# Patient Record
Sex: FEMALE | Race: WHITE
[De-identification: names, ages, dates, MRNs, and addresses within clinical notes are randomized per-mention and may not be internally consistent; named-entity substitution may affect disease eponyms.]

---

## 2019-07-19 NOTE — WOMENS IMAGING REPORT
EXAM DESCRIPTION:  PINK WARRIOR BILATERAL SCREEN



COMPLETED DATE/TIME:  7/19/2019 2:19 pm



REASON FOR STUDY:  ROUTINE SCREENING MAMMOGRAM Z12.31 Z12.31  ENCNTR SCREEN MAMMOGRAM FOR MALIGNANT N
EOPLASM OF BEKAH



COMPARISON:  None.



EXAM PARAMETERS:  Standard craniocaudal and mediolateral oblique views of each breast recorded using 
digital acquisition.  Additional "push-back" craniocaudal and mediolateral oblique images acquired.

Read with the assistance of CAD.

.[facility] - R2  Version 9.2



LIMITATIONS:  None.



FINDINGS:  IMPLANTS: Bilateral subglandular implants.

Findings present which are benign by mammographic criteria.  No suspicious masses, calcifications or 
architectural distortion.

Benign mammographic findings may include one or more of the following:  Smooth masses, popcorn/rim/co
arse calcifications, asymmetries, post-procedure changes, and lesions with long-standing stability.



IMPRESSION:  BENIGN MAMMOGRAPHIC FINDINGS.  BIRADS 2



BREAST DENSITY:  b. There are scattered areas of fibroglandular density.



BIRAD:  ASSESSMENT:  2 BENIGN FINDING(S)



RECOMMENDATION:  ROUTINE SCREENING



COMMENT:  The patient has been notified of the results by letter per MQSA requirements. Additional no
tification policies are in place for contacting patient with suspicious or incomplete findings.

Quality ID #225: The [employer] recommends an annual screening mammogram for women
 aged 40 years or over. This facility utilizes a reminder system to ensure that all patients receive 
reminder letters, and/or direct phone calls for appointments. This includes reminders for routine scr
eening mammograms, diagnostic mammograms, or other Breast Imaging Interventions when appropriate.  Th
is patient will be placed in the appropriate reminder system.



TECHNICAL DOCUMENTATION:  FINDING NUMBER: (1)

ASSESSMENT: (1)

JOB ID:  [id number]

 2011 [employer]- All Rights Reserved



Reading location - IP/workstation name: [facility]-RR

## 2019-12-20 ENCOUNTER — HOSPITAL ENCOUNTER (OUTPATIENT)
Dept: HOSPITAL 62 - CCC | Age: 59
End: 2019-12-20
Payer: COMMERCIAL

## 2019-12-20 DIAGNOSIS — R53.82: ICD-10-CM

## 2019-12-20 DIAGNOSIS — K59.00: Primary | ICD-10-CM

## 2019-12-20 DIAGNOSIS — R94.6: ICD-10-CM

## 2019-12-20 LAB
ADD MANUAL DIFF: NO
BASOPHILS # BLD AUTO: 0 10^3/UL (ref 0–0.2)
BASOPHILS NFR BLD AUTO: 1 % (ref 0–2)
EOSINOPHIL # BLD AUTO: 0.2 10^3/UL (ref 0–0.6)
EOSINOPHIL NFR BLD AUTO: 5.2 % (ref 0–6)
ERYTHROCYTE [DISTWIDTH] IN BLOOD BY AUTOMATED COUNT: 13.5 % (ref 11.5–14)
FREE T4 (FREE THYROXINE): 0.79 NG/DL (ref 0.78–2.19)
HCT VFR BLD CALC: 38.3 % (ref 36–47)
HGB BLD-MCNC: 13.2 G/DL (ref 12–15.5)
LYMPHOCYTES # BLD AUTO: 1.7 10^3/UL (ref 0.5–4.7)
LYMPHOCYTES NFR BLD AUTO: 35.8 % (ref 13–45)
MCH RBC QN AUTO: 33.2 PG (ref 27–33.4)
MCHC RBC AUTO-ENTMCNC: 34.4 G/DL (ref 32–36)
MCV RBC AUTO: 96 FL (ref 80–97)
MONOCYTES # BLD AUTO: 0.4 10^3/UL (ref 0.1–1.4)
MONOCYTES NFR BLD AUTO: 7.6 % (ref 3–13)
NEUTROPHILS # BLD AUTO: 2.4 10^3/UL (ref 1.7–8.2)
NEUTS SEG NFR BLD AUTO: 50.4 % (ref 42–78)
PLATELET # BLD: 302 10^3/UL (ref 150–450)
RBC # BLD AUTO: 3.98 10^6/UL (ref 3.72–5.28)
T3FREE SERPL-MCNC: 4.19 PG/ML (ref 2.77–5.27)
TOTAL CELLS COUNTED % (AUTO): 100 %
TSH SERPL-ACNC: 2.57 UIU/ML (ref 0.47–4.68)
WBC # BLD AUTO: 4.7 10^3/UL (ref 4–10.5)

## 2019-12-20 PROCEDURE — 84443 ASSAY THYROID STIM HORMONE: CPT

## 2019-12-20 PROCEDURE — 36415 COLL VENOUS BLD VENIPUNCTURE: CPT

## 2019-12-20 PROCEDURE — 84439 ASSAY OF FREE THYROXINE: CPT

## 2019-12-20 PROCEDURE — 84481 FREE ASSAY (FT-3): CPT

## 2019-12-20 PROCEDURE — 85025 COMPLETE CBC W/AUTO DIFF WBC: CPT

## 2019-12-26 ENCOUNTER — HOSPITAL ENCOUNTER (OUTPATIENT)
Dept: HOSPITAL 62 - RAD | Age: 59
End: 2019-12-26
Payer: COMMERCIAL

## 2019-12-26 DIAGNOSIS — R10.13: Primary | ICD-10-CM

## 2019-12-26 DIAGNOSIS — K21.9: ICD-10-CM

## 2019-12-26 DIAGNOSIS — K22.5: ICD-10-CM

## 2019-12-26 DIAGNOSIS — K44.9: ICD-10-CM

## 2019-12-26 PROCEDURE — 74249: CPT

## 2019-12-26 NOTE — RADIOLOGY REPORT (SQ)
EXAM DESCRIPTION:  UPPER GI/SM BOWEL



COMPLETED DATE/TIME:  12/26/2019 10:08 am



REASON FOR STUDY:  EPIGASTRIC PAIN R10.13  EPIGASTRIC PAIN



COMPARISON:  None.



TECHNIQUE:  Under fluoroscopic guidance, patient ingested effervescent  granules followed by thick an
d thin barium.  Fluoroscopic spot images and  routine radiographic images acquired and stored on PACS
.

Following evaluation of esophagus and stomach, additional  barium administered with serial delayed ab
dominal radiographs until colonic  identification.  Fluoroscopic images recorded of the terminal ileu
m.

12 MM BARIUM TABLET GIVEN: Yes.

No significant delay in passage.



FLUOROSCOPY TIME:  3.2 minutes of fluoroscopy was used.

19 images saved to PACS.



LIMITATIONS:  None.



FINDINGS:  NEUROMUSCULAR COORDINATION OF SWALLOW: Normal.  No aspiration.  Moderate cricopharyngeal h
ypertrophy and small Zenker's diverticulum noted.

ESOPHAGEAL MOTILITY: Slow and weak primary peristalsis.  Mild tertiary contractions in the distal luz
f esophagus but.

ESOPHAGEAL MUCOSA: Normal mucosa without masses or ulceration.

GASTRO-ESOPHAGEAL JUNCTION: Small sliding hiatal hernia with early Schatzki's ring formation.  Mild r
eflux is seen.

STOMACH: Normal without masses or ulcerations.

GASTRIC OUTLET: No delay in emptying.  Normal pylorus.

DUODENAL BULB: Normal distention. No spasm or ulceration.

DUODENUM: Mucosa normal.  No extrinsic masses or malrotation.

PROXIMAL SMALL BOWEL: Normal as visualized.

JEJUNUM: Normal mucosal pattern.  No dilatation, segmentation, strictures or masses.

ILEUM: Normal mucosal pattern.  No dilatation, segmentation, strictures or masses.

TERMINAL ILEUM AND ILEO-CECAL VALVE: Normal mucosal pattern without cobble-stoning or stricture.  Nor
mal compression.

PROXIMAL COLON: Incompletely imaged.  No abnormality.

NON-GI TRACT STRUCTURES: No significant finding.

OTHER: Transit time is normal with filling of the cecum seen on the 1 hour film.  With



IMPRESSION:  1.  MODERATE CRICOPHARYNGEAL HYPERTROPHY WITH SMALL ZENKER'S DIVERTICULUM.

2.  ESOPHAGEAL DYSMOTILITY.

3.  SMALL HIATAL HERNIA WITH MILD GASTROESOPHAGEAL REFLUX.

4.  NORMAL STOMACH AND SMALL BOWEL FOLLOW-THROUGH.



COMMENT:  Quality :  Final reports for procedures using fluoroscopy that document radiation exp
osure indices, or exposure time and number of fluorographic images (if radiation exposure indices are
 not available)



TECHNICAL DOCUMENTATION:  JOB ID:  [id number]

 [employer]- All Rights Reserved



Reading location - IP/workstation name: [email or web address]